# Patient Record
Sex: FEMALE | Race: OTHER | URBAN - METROPOLITAN AREA
[De-identification: names, ages, dates, MRNs, and addresses within clinical notes are randomized per-mention and may not be internally consistent; named-entity substitution may affect disease eponyms.]

---

## 2017-01-11 NOTE — PATIENT DISCUSSION
Recommended weight and BMI control through healthy diet and exercise, green leafy veggies, UV protection, and not smoking. Reviewed the importance of daily monitoring of the vision in each eye independently, along with the use of the Amsler grid daily and instructed patient to call and return immediately for any new changes in their vision or on the Amsler grid. Patient instructed on the importance of regular follow up and monitoring for the early detection of conversion to wet AMD as early detection results in early treatment and better outcomes.

## 2017-01-11 NOTE — PATIENT DISCUSSION
Capsular haze appears visually significant; RECOMMEND FUTURE CONSULT with Anterior Segment surgeon for possible YAG capsulotomy.

## 2017-07-25 NOTE — PATIENT DISCUSSION
Capsular haze appears visually significant, RECOMMEND CONSULT with DR Chaves Line OFFICE for possible YAG capsulotomy.

## 2018-01-25 NOTE — PATIENT DISCUSSION
Capsular haze appears visually significant, RECOMMEND CONSULT with DR Major Brody OFFICE for possible YAG capsulotomy.

## 2018-02-01 NOTE — PATIENT DISCUSSION
REPORT 1 30 2018 - MILD REDUCTION IN HCT AND ^ MCH.  NEGATIVE TOXO, HLA-B27, LUPUS ANTICOAGULANT, LYME, RF, AND RPR.  RECOM F/U INTERNIST FOR MILD REDUCTION IN HCT.

## 2018-08-01 NOTE — PATIENT DISCUSSION
"""S/P IOL OD: Tecnis ZCB00 24.5 (Target: [place]) +Femto/Arcs +TM/Omidria. Continue post operative instructions and drops per schedule.  """

## 2018-08-10 NOTE — PATIENT DISCUSSION
"""S/P IOL OD: Tecnis ZCB00 24.5 (Target: [place]) +Femto/Arcs +TM/Omidria. Continue post operative instructions and drops per schedule.  """

## 2018-08-16 NOTE — PATIENT DISCUSSION
"""S/P IOL OS: Tecnis ZCB00 24.0 (Target: [place]) +Femto/Arcs +TM/Omidria. Continue post operative instructions and drops per schedule.  """

## 2018-08-24 NOTE — PATIENT DISCUSSION
"""S/P IOL OS: Tecnis ZCB00 24.0 (Target: [place]) +Femto/Arcs +TM/Omidria. Continue post operative instructions and drops per schedule.  """

## 2018-11-06 NOTE — PATIENT DISCUSSION
GIVEN RECURRENT NATURE OF UVEITIS - TO REDUCE TO QD AND CONTINUE GIVEN MULTIPLE EPISODES OF RECURRENT UVEITIS - THEN REEVAL IN 3 MONTHS.

## 2018-11-06 NOTE — PATIENT DISCUSSION
10 2 18 ON PF TID - STARTED 2 WKS AGO AT QID - REDUCE TO BID THEN CONTINUE AT BID DUE TO HX RECURRENCE.

## 2018-11-06 NOTE — PATIENT DISCUSSION
LAB WORK UP - REPORT 1 30 2018 - MILD REDUCTION IN HCT AND ^ MCH.  NEGATIVE TOXO, HLA-B27, LUPUS ANTICOAGULANT, LYME, RF, AND RPR.  RECOM F/U INTERNIST FOR MILD REDUCTION IN HCT.

## 2019-02-12 NOTE — PATIENT DISCUSSION
GIVEN RECURRENT NATURE OF UVEITIS - TO CONTINUE PF QD  GIVEN MULTIPLE EPISODES OF RECURRENT UVEITIS - THEN REEVAL IN 3 MONTHS.

## 2019-11-19 NOTE — PATIENT DISCUSSION
11 19 19 FLARED UP 11 15 19 PER PT - PT INCREASE PF TO TID - TO INCREASE TO Q2 WHILE AWAKE FOR 3 DAYS THEN REDUCE TO QID  - REEVAL 2 WKS.

## 2020-01-22 NOTE — PATIENT DISCUSSION
The level of diabetic retinopathy was communicated to provider. Complex Repair And Dorsal Nasal Flap Text: The defect edges were debeveled with a #15 scalpel blade.  The primary defect was closed partially with a complex linear closure.  Given the location of the remaining defect, shape of the defect and the proximity to free margins a dorsal nasal flap was deemed most appropriate for complete closure of the defect.  Using a sterile surgical marker, an appropriate flap was drawn incorporating the defect and placing the expected incisions within the relaxed skin tension lines where possible.    The area thus outlined was incised deep to adipose tissue with a #15 scalpel blade.  The skin margins were undermined to an appropriate distance in all directions utilizing iris scissors.

## 2021-03-23 NOTE — PATIENT DISCUSSION
IMPROVED - TO TAPER PF AND STOP CYCLOGEL. Diet, Soft  No concentrated Potassium  No concentrated Phosphorus  Low Sodium

## 2021-08-30 NOTE — PATIENT DISCUSSION
10 2 18 ON PF TID - STARTED 2 WKS AGO AT QID - REDUCE TO BID THEN CONTINUE AT BID DUE TO HX RECURRENCE.
ARTIFICIAL TEARS to affected eye(s) as needed.
BMI above normal limits, recommended weight loss, improve diet and follow up with internist.
Based on patient's symptoms, exam, diagnostic testing and records the following are my findings and recommendations.
CONT DROPS.
CONT F/U DR LARES'S OFFICE.
Continue current management at this time.
Continue to MONITOR CLOSELY to determine the need for TREATMENT and INCREASE/DECREASE in length of time till next follow up visit.
DIFFUSE LEAK ON FA 1 25 18 NO SIG EDEMA ON OCT TODAY - TO FOLLOW.
Discussed the importance of blood sugar control in the prevention of ocular complications.
Does NOT APPEAR VISUALLY SIGNIFICANT.
ERM does NOT APPEAR VISUALLY SIGNIFICANT.
IMPROVED - TO TAPER PF AND STOP CYCLOGEL.
IMPROVED 7 24 18 - TO TAPER PF ONE DROP A WEEK AND UNTIL HE REACHES BID.
IOP within reasonable range TODAY.
MILD.
MONITOR response to TREATMENT.
NO SIG EDEMA ON OCT 8 21 18 - TO FOLLOW.
No new tear/breaks/detachment seen TODAY.
ON PF QD ON 8 21 18 - DOING WELL - TO STOP PF IN 1 WK.
PT OFF DROPS 6 6 18 - NO SIGN OF RECURRENCE - STOPPED DROPS A FEW MONTHS AGO.
RECURRENT - NON GRANULOMATOUS - MILD - TO GET W/U AND START PF 1 25 18.
REPORT 1 30 2018 - MILD REDUCTION IN HCT AND ^ MCH.  NEGATIVE TOXO, HLA-B27, LUPUS ANTICOAGULANT, LYME, RF, AND RPR.  RECOM F/U INTERNIST FOR MILD REDUCTION IN HCT.
RESOLVED EDEMA 7 25 17.
RESUMED 7 16 18  - PF QID - ? COMPONENT OF VH - TO RECHECK IN 1 WK.
Recommend LID HYGIENE with warm compress and/or eyelid wash at least once a day.
Recommend OBSERVATION AND CONTINUED MONITORING.
Recommend OBSERVATION and continued MONITORING for new tear/break/retinal detachment.
Recommended OBSERVATION and MONITORING for change.
Recommended OBSERVATION and continued MONITORING for progression.
Recommended continued MONITORING of RESPONSE to therapy.
Recommended weight and BMI control through healthy diet and exercise, green leafy veggies, UV protection, and not smoking. Reviewed the importance of daily monitoring of the vision in each eye independently, along with the use of the Amsler grid daily and instructed patient to call and return immediately for any new changes in their vision or on the Amsler grid. Patient instructed on the importance of regular follow up and monitoring for the early detection of conversion to wet AMD as early detection results in early treatment and better outcomes.
Retina ATTACHED and doing well today.
Retinal exam findings communicated to Physician managing diabetes.
The absence of macula edema findings were communicated to provider.
The level of diabetic retinopathy was communicated to provider.
Well positioned.
0

## 2022-01-31 NOTE — PATIENT DISCUSSION
1 31 25 - PT DID NOT WANT OCT - TO GET NEXT VISIT - [facility] [address] [facility]

## 2022-03-23 ENCOUNTER — NEW PATIENT (OUTPATIENT)
Dept: URBAN - METROPOLITAN AREA CLINIC 20 | Facility: CLINIC | Age: 62
End: 2022-03-23

## 2022-03-23 DIAGNOSIS — H52.4: ICD-10-CM

## 2022-03-23 DIAGNOSIS — H16.223: ICD-10-CM

## 2022-03-23 PROCEDURE — 92004 COMPRE OPH EXAM NEW PT 1/>: CPT

## 2022-03-23 PROCEDURE — 92015 DETERMINE REFRACTIVE STATE: CPT

## 2022-03-23 ASSESSMENT — KERATOMETRY
OD_K2POWER_DIOPTERS: 46.50
OD_AXISANGLE2_DEGREES: 63
OD_AXISANGLE_DEGREES: 153
OS_K1POWER_DIOPTERS: 46.50
OD_K1POWER_DIOPTERS: 46.25
OS_K2POWER_DIOPTERS: 47.00
OS_AXISANGLE_DEGREES: 157
OS_AXISANGLE2_DEGREES: 67

## 2022-03-23 ASSESSMENT — TONOMETRY
OD_IOP_MMHG: 13
OS_IOP_MMHG: 15

## 2022-03-23 ASSESSMENT — VISUAL ACUITY
OD_SC: 20/20
OS_SC: 20/30-1
OU_SC: 20/30

## 2022-07-22 NOTE — PROCEDURE NOTE: CLINICAL
PROCEDURE NOTE: Focal Laser, Retina OD. Diagnosis: Macular Edema. Anesthesia: Lidocaine 4%. Prior to focal laser, risks/benefits/alternatives to laser discussed including loss of vision and patient wished to proceed. An informed consent was obtained and no assurances or guarantees were given. Spot size: 50 um. Power: 160 mW. Number of pulses: 17. Pulse duration: 50 ms. Procedure Time: 5:00PM. Patient tolerated procedure well. There were no complications. Post procedure instructions given. Patient given office phone number/answering service number and advised to call immediately should there be loss of vision or pain, or should they have any other questions or concerns. Angeline Thompsoner

## 2022-11-02 NOTE — PATIENT DISCUSSION
Patient is under the care of [facility] to keep regular scheduled visits.

## 2022-11-02 NOTE — PATIENT DISCUSSION
Patient requesting RN to obtain prior auth for 7/5/2022 CLN. Please call. Thank you. Recommended observation.

## 2022-11-02 NOTE — PATIENT DISCUSSION
Discussed with patient that they have severe dry macular degeneration. Continue on recommended  AREDS vitamins. Monitor vision weekly with amsler grids. Remember to wear sunglasses and avoid smoking. If significant changes noted on grid patient should contact our office to be reexamined.

## 2022-11-02 NOTE — PROCEDURE NOTE: CLINICAL
PROCEDURE NOTE: YAG Capsulotomy OD. Diagnosis: Posterior Capsular Opacification (PCO). Prep: Mydriacil 1% and Phenylephrine 2.5%. Prior to treatment, the risks/benefits/alternatives were discussed. The patient wished to proceed with procedure. Consent was signed. Proparacaine and brominidine were placed into the operative eye after the eye was dilated. Power = 1.2mJ. Number of pulses = 63. Patient tolerated procedure well and there were no complications. Post Laser instructions given. Brooke Canter

## 2022-11-02 NOTE — PATIENT DISCUSSION
PCO ([id number]): Visually significant PCO present on exam today. Recommend YAG laser capsulotomy to improve vision and decrease glare symptoms. RBAs of procedure discussed. Patient agrees and wishes to proceed.

## 2022-11-14 NOTE — PROCEDURE NOTE: CLINICAL
PROCEDURE NOTE: Focal Laser, Retina OD. Diagnosis: Diabetic Macular Edema. Anesthesia: Lidocaine 4%. Prior to focal laser, risks/benefits/alternatives to laser discussed including loss of vision and patient wished to proceed. An informed consent was obtained and no assurances or guarantees were given. Spot size: 50 um. Power: 90 mW. Number of pulses: 20. Pulse duration: 50 ms. Procedure Time: 1:38 PM. Patient tolerated procedure well. There were no complications. Post procedure instructions given. Patient given office phone number/answering service number and advised to call immediately should there be loss of vision or pain, or should they have any other questions or concerns. Shama Sharma

## 2022-12-13 NOTE — PROCEDURE NOTE: CLINICAL
PROCEDURE NOTE: YAG Capsulotomy OS. Diagnosis: Posterior Capsular Opacification (PCO). Prep: Mydriacil 1% and Phenylephrine 2.5%. Prior to treatment, the risks/benefits/alternatives were discussed. The patient wished to proceed with procedure. Consent was signed. Proparacaine and brominidine were placed into the operative eye after the eye was dilated. Power = 1.3mJ. Number of pulses = 22. Patient tolerated procedure well and there were no complications. Post Laser instructions given. Charly Eden

## 2022-12-13 NOTE — PATIENT DISCUSSION
PCO ([address]): Visually significant PCO present on exam today. Recommend YAG laser capsulotomy to improve vision and decrease glare symptoms. RBAs of procedure discussed. Patient agrees and wishes to proceed.

## 2023-01-04 NOTE — PATIENT DISCUSSION
Recommended weight and BMI control through healthy diet and exercise, green leafy veggies, UV protection, and not smoking. Reviewed the importance of daily monitoring of the vision in each eye independently, along with the use of the Amsler grid daily and instructed patient to call and return immediately for any new changes in their vision or on the Amsler grid. Patient instructed on the importance of regular follow up and monitoring for the early detection of conversion to wet AMD as early detection results in early treatment and better outcomes. Detail Level: Zone Additional Notes: Patient consent was obtained to proceed with the visit and recommended plan of care after discussion of all risks and benefits, including the risks of COVID-19 exposure. Render Risk Assessment In Note?: no

## 2023-04-26 ENCOUNTER — ESTABLISHED PATIENT (OUTPATIENT)
Dept: URBAN - METROPOLITAN AREA CLINIC 20 | Facility: CLINIC | Age: 63
End: 2023-04-26

## 2023-04-26 DIAGNOSIS — H16.223: ICD-10-CM

## 2023-04-26 DIAGNOSIS — H52.4: ICD-10-CM

## 2023-04-26 PROCEDURE — 92014 COMPRE OPH EXAM EST PT 1/>: CPT

## 2023-04-26 PROCEDURE — 92015 DETERMINE REFRACTIVE STATE: CPT

## 2023-04-26 ASSESSMENT — TONOMETRY
OD_IOP_MMHG: 10
OS_IOP_MMHG: 10

## 2023-04-26 ASSESSMENT — VISUAL ACUITY
OU_SC: 20/20-2
OU_SC: J5
OD_SC: 20/20
OS_SC: 20/30+2

## 2025-01-27 ENCOUNTER — COMPREHENSIVE EXAM (OUTPATIENT)
Age: 65
End: 2025-01-27

## 2025-01-27 DIAGNOSIS — H25.813: ICD-10-CM

## 2025-01-27 DIAGNOSIS — H52.4: ICD-10-CM

## 2025-01-27 PROCEDURE — 92014 COMPRE OPH EXAM EST PT 1/>: CPT

## 2025-01-27 PROCEDURE — 92015 DETERMINE REFRACTIVE STATE: CPT
